# Patient Record
Sex: MALE | Race: BLACK OR AFRICAN AMERICAN | Employment: FULL TIME | URBAN - METROPOLITAN AREA
[De-identification: names, ages, dates, MRNs, and addresses within clinical notes are randomized per-mention and may not be internally consistent; named-entity substitution may affect disease eponyms.]

---

## 2017-09-02 PROBLEM — S76.111A RUPTURE OF RIGHT QUADRICEPS TENDON: Status: ACTIVE | Noted: 2017-09-02

## 2017-09-20 ENCOUNTER — OFFICE VISIT (OUTPATIENT)
Dept: ORTHOPEDIC SURGERY | Age: 57
End: 2017-09-20

## 2017-09-20 VITALS — WEIGHT: 315 LBS | RESPIRATION RATE: 16 BRPM | HEIGHT: 70 IN | BODY MASS INDEX: 45.1 KG/M2

## 2017-09-20 DIAGNOSIS — Z09 POSTOP CHECK: Primary | ICD-10-CM

## 2017-09-20 DIAGNOSIS — S76.111A RUPTURE OF RIGHT QUADRICEPS TENDON, INITIAL ENCOUNTER: ICD-10-CM

## 2017-09-20 PROCEDURE — 99024 POSTOP FOLLOW-UP VISIT: CPT | Performed by: ORTHOPAEDIC SURGERY

## 2017-09-20 RX ORDER — LORATADINE 10 MG/1
1 TABLET ORAL
COMMUNITY
End: 2017-11-30

## 2017-09-20 RX ORDER — TRAMADOL HYDROCHLORIDE 50 MG/1
50 TABLET ORAL
COMMUNITY
End: 2017-11-30

## 2017-09-20 RX ORDER — GABAPENTIN 600 MG/1
TABLET ORAL
COMMUNITY
Start: 2017-06-28 | End: 2017-09-20 | Stop reason: SDUPTHER

## 2017-09-20 RX ORDER — SIMVASTATIN 80 MG
80 TABLET ORAL
COMMUNITY
End: 2017-11-30

## 2017-09-20 RX ORDER — LEVETIRACETAM 500 MG/1
500 TABLET ORAL
COMMUNITY
Start: 2015-09-04

## 2017-09-20 RX ORDER — HYDROCODONE BITARTRATE AND ACETAMINOPHEN 5; 325 MG/1; MG/1
1 TABLET ORAL
COMMUNITY

## 2017-09-20 RX ORDER — PROMETHAZINE HYDROCHLORIDE 12.5 MG/1
12.5 TABLET ORAL
COMMUNITY
Start: 2010-06-30 | End: 2017-11-30

## 2017-09-20 RX ORDER — HYDROCHLOROTHIAZIDE 25 MG/1
25 TABLET ORAL
COMMUNITY

## 2017-09-20 RX ORDER — METHOCARBAMOL 500 MG/1
500 TABLET, FILM COATED ORAL
COMMUNITY
Start: 2010-06-26

## 2017-09-20 RX ORDER — LISINOPRIL 10 MG/1
10 TABLET ORAL
COMMUNITY

## 2017-09-20 RX ORDER — MECLIZINE HCL 12.5 MG/1
12.5 TABLET ORAL
COMMUNITY
Start: 2010-06-30 | End: 2017-11-30

## 2017-10-02 ENCOUNTER — TELEPHONE (OUTPATIENT)
Dept: ORTHOPEDIC SURGERY | Age: 57
End: 2017-10-02

## 2017-10-04 ENCOUNTER — TELEPHONE (OUTPATIENT)
Dept: ORTHOPEDIC SURGERY | Age: 57
End: 2017-10-04

## 2017-10-04 LAB — C-REACTIVE PROTEIN: 7.3

## 2017-10-18 ENCOUNTER — OFFICE VISIT (OUTPATIENT)
Dept: ORTHOPEDIC SURGERY | Age: 57
End: 2017-10-18

## 2017-10-18 VITALS — RESPIRATION RATE: 16 BRPM | WEIGHT: 315 LBS | BODY MASS INDEX: 45.1 KG/M2 | HEIGHT: 70 IN

## 2017-10-18 DIAGNOSIS — Z09 S/P ORTHOPEDIC SURGERY, FOLLOW-UP EXAM: Primary | ICD-10-CM

## 2017-10-18 DIAGNOSIS — R52 PAIN: ICD-10-CM

## 2017-10-18 DIAGNOSIS — S76.111A RUPTURE OF RIGHT QUADRICEPS TENDON, INITIAL ENCOUNTER: ICD-10-CM

## 2017-10-18 PROCEDURE — 99024 POSTOP FOLLOW-UP VISIT: CPT | Performed by: ORTHOPAEDIC SURGERY

## 2017-10-18 NOTE — PATIENT INSTRUCTIONS
Physical therapy. ..  lower extremity (quad strengthening program, begin preparing patient for full ambulation 11/28/17)  Right knee full range of motion as tolerated, without brace  Weight bearing as tolerated on left, only while in cam boot  Weight bearing as tolerated on right only while in knee brace  Use right knee brace only while standing, may remove brace when non weight bearing  Patient may use a wheel chair and travel by wheelchair. Please allow patient to go home for thanksgiving.    Return to the office in 6 weeks      Please show this form to nursing Center [place]

## 2017-10-20 ENCOUNTER — TELEPHONE (OUTPATIENT)
Dept: ORTHOPEDIC SURGERY | Age: 57
End: 2017-10-20

## 2017-10-20 NOTE — TELEPHONE ENCOUNTER
Nursing home called requesting a stop date for lovenox  Procedure Name: Repair of right quadriceps tendon tear (13443).    Surgery Date: September/5/2017

## 2017-10-26 ENCOUNTER — TELEPHONE (OUTPATIENT)
Dept: ORTHOPEDIC SURGERY | Age: 57
End: 2017-10-26

## 2017-10-27 ENCOUNTER — OFFICE VISIT (OUTPATIENT)
Dept: ORTHOPEDIC SURGERY | Age: 57
End: 2017-10-27

## 2017-10-27 DIAGNOSIS — Z09 S/P ORTHOPEDIC SURGERY, FOLLOW-UP EXAM: Primary | ICD-10-CM

## 2017-10-27 DIAGNOSIS — S76.111A RUPTURE OF RIGHT QUADRICEPS TENDON, INITIAL ENCOUNTER: ICD-10-CM

## 2017-10-27 PROCEDURE — 99024 POSTOP FOLLOW-UP VISIT: CPT | Performed by: ORTHOPAEDIC SURGERY

## 2017-10-27 NOTE — PROGRESS NOTES
Scribe Authentication Statement  Cristal Peterson scribed portions of this documentation for and in the presence of Dr. Aleyda Hernandez DO on 10/27/17 at 9:55 AM.    Provider Authentication Statement:  I, Venu Burgos DO, personally performed the services described in this documentation and they were scribed in my presence by the above listed scribe. The documentation is both accurate and complete. ORTHOPEDIC FOLLOW UP AFTER SURGERY    HISTORY OF PRESENT ILLNESS (HPI):   Wilma Harley is a 56y.o. year old male who is here for follow up of:  1. S/P orthopedic surgery, follow-up exam    2. Rupture of right quadriceps tendon, initial encounter        Procedure Name:  Repair of right quadriceps tendon tear (63360). Surgery Date: September/5/2017   Post-Op Number: 6 week(s)   How has the problem changed since the last visit: gradually improving. Patient recently was performing a straight leg raise and felt a tearing sensation in the medial aspect of the right knee. He states that he did not lose the ability to extend the right knee. However, he has been having increased pain at the quadricep tendon area and along the medial side of the knee. He denies any bruising. He denies any new changes in the contours of the knee. How have the associated manifestations changed since the last visit:   intermittent and . New associated manifestations are: none   Adverse effects or complications: none   Other Comments:  patient is doing well at [facility]. Knee pain continues to diminish other than his recent episode of knee pain with the straight leg raise. Current Outpatient Prescriptions   Medication Sig Dispense Refill    hydrochlorothiazide (HYDRODIURIL) 25 MG tablet Take 25 mg by mouth      HYDROcodone-acetaminophen (NORCO) 5-325 MG per tablet Take 1 tablet by mouth .       levETIRAcetam (KEPPRA) 500 MG tablet Take 500 mg by mouth      lisinopril (PRINIVIL;ZESTRIL) 10 MG tablet Take 10 mg by mouth     

## 2017-11-07 ENCOUNTER — TELEPHONE (OUTPATIENT)
Dept: ORTHOPEDIC SURGERY | Age: 57
End: 2017-11-07

## 2017-11-07 NOTE — TELEPHONE ENCOUNTER
WBAT in the brace.   Post op ROM brace fitted and given  Wear brace full time   Keep locked when ambulating  0° extension, 40° flexion when sitting  Continue physical therapy and quad strengthing program

## 2017-11-08 NOTE — TELEPHONE ENCOUNTER
Rainer Parr therapist called to get the WB status for patient informed  Therapist per Dr. Galan Button in the brace.   Post op ROM brace fitted and given  Wear brace full time   Keep locked when ambulating  0° extension, 40° flexion when sitting  Continue physical therapy and quad strengthing program

## 2017-11-29 ENCOUNTER — OFFICE VISIT (OUTPATIENT)
Dept: ORTHOPEDIC SURGERY | Age: 57
End: 2017-11-29

## 2017-11-29 VITALS — HEIGHT: 70 IN | RESPIRATION RATE: 16 BRPM | WEIGHT: 315 LBS | BODY MASS INDEX: 45.1 KG/M2

## 2017-11-29 DIAGNOSIS — Z09 S/P ORTHOPEDIC SURGERY, FOLLOW-UP EXAM: Primary | ICD-10-CM

## 2017-11-29 DIAGNOSIS — S76.111A RUPTURE OF RIGHT QUADRICEPS TENDON, INITIAL ENCOUNTER: ICD-10-CM

## 2017-11-29 PROCEDURE — 99024 POSTOP FOLLOW-UP VISIT: CPT | Performed by: ORTHOPAEDIC SURGERY

## 2017-11-29 NOTE — PATIENT INSTRUCTIONS
Discontinue knee brace  May ambulate with walker only, no independent ambulation (without Walker)  advance therapy and quad strengthening , active and passive ROM of right knee to tolerance  Goal of normal strength and ROM to right knee in 6 weeks   Ideally patient would be able to return home and full duty in 6 weeks, however individual results vary  And his progress will be re-evaluated in 6 weeks.    Return to the office in 6 weeks         Show this form to nursing home an therapy

## 2017-11-29 NOTE — PROGRESS NOTES
Scribe Authentication Statement  Hermelinda Garcia scribed portions of this documentation for and in the presence of Dr. Zia Dominique DO on 11/29/17 at 10:03 AM.    Provider Authentication Statement:  IRamon DO, personally performed the services described in this documentation and they were scribed in my presence by the above listed scribe. The documentation is both accurate and complete. ORTHOPEDIC FOLLOW UP AFTER SURGERY    HISTORY OF PRESENT ILLNESS (HPI):   Rufina Lawrence is a 56y.o. year old male who is here for follow up of:  1. S/P orthopedic surgery, follow-up exam    2. Rupture of right quadriceps tendon, initial encounter        Procedure Name: Repair of right quadriceps tendon tear (73746). Surgery Date: September/5/2017   Post-Op Number: 11 week(s)   How has the problem changed since the last visit: gradually improving   How have the associated manifestations changed since the last visit: stiffness and swelling  intermittent   New associated manifestations are: none   Adverse effects or complications: none   Other Comments:   patient is doing well at [facility]. Knee pain continues to diminish. He is still doing therapy and wearing his post op ROM brace. Current Outpatient Prescriptions   Medication Sig Dispense Refill    hydrochlorothiazide (HYDRODIURIL) 25 MG tablet Take 25 mg by mouth      HYDROcodone-acetaminophen (NORCO) 5-325 MG per tablet Take 1 tablet by mouth .       levETIRAcetam (KEPPRA) 500 MG tablet Take 500 mg by mouth      lisinopril (PRINIVIL;ZESTRIL) 10 MG tablet Take 10 mg by mouth      loratadine (CLARITIN) 10 MG tablet Take 1 tablet by mouth      meclizine (ANTIVERT) 12.5 MG tablet Take 12.5 mg by mouth      methocarbamol (ROBAXIN) 500 MG tablet Take 500 mg by mouth      promethazine (PHENERGAN) 12.5 MG tablet Take 12.5 mg by mouth      simvastatin (ZOCOR) 80 MG tablet Take 80 mg by mouth      traMADol (ULTRAM) 50 MG tablet Take 50 mg by mouth      divalproex (DEPAKOTE ER) 500 MG extended release tablet Take 1 tablet by mouth 2 times daily 30 tablet 3    gabapentin (NEURONTIN) 300 MG capsule Take 3 capsules by mouth 3 times daily 90 capsule 3    atorvastatin (LIPITOR) 10 MG tablet Take 1 tablet by mouth nightly 30 tablet 3    aspirin EC 81 MG EC tablet Take 1 tablet by mouth daily 30 tablet 3    baclofen (LIORESAL) 10 MG tablet Take 10 mg by mouth 3 times daily      fondaparinux (ARIXTRA) 2.5 MG/0.5ML SOLN Inject into the skin daily      oxyCODONE-acetaminophen (PERCOCET) 5-325 MG per tablet Take 1 tablet by mouth every 4 hours as needed for Pain .  carvedilol (COREG) 25 MG tablet Take 25 mg by mouth 2 times daily (with meals). ONE AND ONE HALF TAB TWICE DAILY       amlodipine (NORVASC) 10 MG tablet Take 10 mg by mouth 2 times daily. No current facility-administered medications for this visit. ORTHOPEDIC EXAM:     [] Left Upper Extremity [] Right Upper Extremity  [] Left Lower Extremity [x] Right Lower Extremity    Inspection:  Incision: [x]Healing well without significant drainage, dehiscence, or significant erythema. []Erythema greater than expected:  []Dehiscence:  []Drainage:   Circulation: [x] Warm, well perfused, good capillary refill  [] Cool  [] Sluggish cap refill  [] Other:   Skin: [x] Intact without discoloration  [] Pale  [] Erythematous  [] Ecchymotic   [] Maceration  [] Other:  [] Wound:   Cast/Splint: [x] None  [] Intact, dry, and functional without any unexpected wear  [] Poorly fitting  [] Wet  [] Foul smelling  [] Broken  [] Patient altered  [] Foreign body within  [] Soft  [] Other:     Palpation:  Tenderness: [] None  [] No more than expected  [x] Pertinent positives & negatives: tenderness medial joint line   Swelling: [] None  [x] No more than expected  [] Pertinent positives & negatives:   Calor (Heat) [x] None  [] No more than expected  [] Location:   Other Findings: - No palpable gap in extensor mechanism.   Full

## 2017-12-11 ENCOUNTER — TELEPHONE (OUTPATIENT)
Dept: ORTHOPEDIC SURGERY | Age: 57
End: 2017-12-11

## 2017-12-11 NOTE — TELEPHONE ENCOUNTER
Called patient back and repeated discussion from last office visit:    Discontinue knee brace  May ambulate with walker only, no independent ambulation (without Walker)  advance therapy and quad strengthening , active and passive ROM of right knee to tolerance  Goal of normal strength and ROM to right knee in 6 weeks   Ideally patient would be able to return home and full duty in 6 weeks, however individual results vary  And his progress will be re-evaluated in 6 weeks.    Return to the office in 6 weeks

## 2017-12-11 NOTE — TELEPHONE ENCOUNTER
Pt called requesting a call from provider. He would like to know his expected return to work date. He would also like to know if when he does return to work if he could have light duty work and working half days.

## 2018-01-05 ENCOUNTER — OFFICE VISIT (OUTPATIENT)
Dept: ORTHOPEDIC SURGERY | Age: 58
End: 2018-01-05

## 2018-01-05 VITALS — HEIGHT: 74 IN | WEIGHT: 315 LBS | BODY MASS INDEX: 40.43 KG/M2 | RESPIRATION RATE: 16 BRPM

## 2018-01-05 DIAGNOSIS — Z09 S/P ORTHOPEDIC SURGERY, FOLLOW-UP EXAM: Primary | ICD-10-CM

## 2018-01-05 DIAGNOSIS — S76.111A RUPTURE OF RIGHT QUADRICEPS TENDON, INITIAL ENCOUNTER: ICD-10-CM

## 2018-01-05 PROCEDURE — 99024 POSTOP FOLLOW-UP VISIT: CPT | Performed by: ORTHOPAEDIC SURGERY

## 2018-01-05 ASSESSMENT — ENCOUNTER SYMPTOMS
GASTROINTESTINAL NEGATIVE: 1
RESPIRATORY NEGATIVE: 1
EYES NEGATIVE: 1

## 2018-01-05 NOTE — PATIENT INSTRUCTIONS
:   · Diagnostic and treatment options discussed. Diagnosis explained. Natural history discussed. Patient's questions answered.   · No squatting, kneeling, climbing  · May do stairs one at a time  · Use cane for ambulation  · Above restrictions until January 22  · Return to the office January 19th with Dr. Corky Whiteside

## 2018-01-11 ENCOUNTER — TELEPHONE (OUTPATIENT)
Dept: ORTHOPEDIC SURGERY | Age: 58
End: 2018-01-11

## 2018-01-18 VITALS — BODY MASS INDEX: 40.43 KG/M2 | WEIGHT: 315 LBS | RESPIRATION RATE: 16 BRPM | HEIGHT: 74 IN

## 2018-01-18 ASSESSMENT — ENCOUNTER SYMPTOMS
GASTROINTESTINAL NEGATIVE: 1
EYES NEGATIVE: 1
RESPIRATORY NEGATIVE: 1

## 2018-01-18 NOTE — PROGRESS NOTES
COLONOSCOPY  2015    FOOT SURGERY Left 08/31/2017    achilles tendon repair    FOOT SURGERY Right 2005    achilles tendon repair    OTHER SURGICAL HISTORY  09/05/2017    rupture right quadriceps tendon    OTHER SURGICAL HISTORY  2015    \"had surgry for enlarged breast reduction       No family history on file. Social History     Social History    Marital status:      Spouse name: N/A    Number of children: N/A    Years of education: N/A     Social History Main Topics    Smoking status: Never Smoker    Smokeless tobacco: Never Used    Alcohol use No    Drug use: No    Sexual activity: Not Asked     Other Topics Concern    None     Social History Narrative    None         ORTHOPEDIC CONSTITUTION EXAM:     Vital Signs:  Resp 16   Ht 6' 2\" (1.88 m)   Wt (!) 315 lb (142.9 kg)   BMI 40.44 kg/m²     Constitution:  Generally, the patient is [x] alert, [x] appears stated age, and [x] in no distress. General body habitus is:   []Cachectic  []Thin  []Normal  []Overweight  []Obese  [x]Morbidly Obese     Psychiatric:   Judgement and insight is:  [x]Good    []Poor  Oriented to:  [x]person, [x]place, [x]time. Mood for circumstances is:  [x]Appropriate   []Inappropriate    Gait and Station:   Gait is:  []Normal  [x]Antalgic   []Trendelenburg   []Wide   []Unsteady   []Other:  Assistive Device:  [x]Cane    []Crutches    []Walker    []Wheelchair    []Gurney  Weight bearing on injured extremity:  [x]Is able   []Is not able      ORTHOPEDIC KNEE EXAM:     KNEE EXAM:  [x]Right []Left  Circulation:  [x] The limb is warm and well perfused. [x] Capillary refill is intact. [] Edema:    Inspection:  [x] Skin intact without abrasion or lacerations.   [x] Leg lengths equal  [] Unequal leg length:  [] Ecchymosis:  [] Abrasion:  [] Laceration:   [] Scar / Surgical incision:  [] Orthopedic appliance:  [] Joint effusion:  []Mild   []Moderate   []Severe    Alignment:  [x] Normal Knee Alignment   Normal Measured Findings

## 2018-01-19 ENCOUNTER — OFFICE VISIT (OUTPATIENT)
Dept: ORTHOPEDIC SURGERY | Age: 58
End: 2018-01-19

## 2018-01-19 DIAGNOSIS — S76.111A RUPTURE OF RIGHT QUADRICEPS TENDON, INITIAL ENCOUNTER: ICD-10-CM

## 2018-01-19 DIAGNOSIS — Z09 S/P ORTHOPEDIC SURGERY, FOLLOW-UP EXAM: Primary | ICD-10-CM

## 2018-01-19 PROCEDURE — 99213 OFFICE O/P EST LOW 20 MIN: CPT | Performed by: ORTHOPAEDIC SURGERY

## 2018-01-19 NOTE — LETTER
[facility]  [address]  [facility] [address]  Phone: [phone]  Fax: [phone]    William Castillo DO        January 19, 2018     Patient: Lg Newton   YOB: 1960   Date of Visit: 1/19/2018       To Whom It May Concern: It is my medical opinion that Alexia Boyd is able to return to work with restrictions:    · No squatting, kneeling, climbing  · May do stairs one at a time  · Use cane for ambulation    No prolonged standing more than 5min at a time. If you have any questions or concerns, please don't hesitate to call.     Sincerely,        William Castillo DO

## 2018-02-14 ENCOUNTER — HOSPITAL ENCOUNTER (OUTPATIENT)
Dept: PHYSICAL THERAPY | Age: 58
Discharge: HOME | End: 2018-02-28

## 2018-02-14 ASSESSMENT — PAIN DESCRIPTION - ONSET: ONSET: ON-GOING

## 2018-02-14 ASSESSMENT — PAIN DESCRIPTION - PROGRESSION: CLINICAL_PROGRESSION: GRADUALLY WORSENING

## 2018-02-14 ASSESSMENT — PAIN DESCRIPTION - ORIENTATION: ORIENTATION: LEFT;RIGHT

## 2018-02-14 ASSESSMENT — PAIN DESCRIPTION - DESCRIPTORS: DESCRIPTORS: ACHING

## 2018-02-14 ASSESSMENT — PAIN DESCRIPTION - PAIN TYPE: TYPE: CHRONIC PAIN;SURGICAL PAIN

## 2018-02-14 ASSESSMENT — PAIN DESCRIPTION - LOCATION: LOCATION: KNEE;ANKLE

## 2018-02-14 NOTE — FLOWSHEET NOTE
Outpatient Physical Therapy           [place]           [] Phone: [phone]   Fax: [phone]  [place]           [] Phone: [phone]   Fax: [phone]    Physical Therapy Daily Treatment Note  Date:  2018    Patient Name:  Jeanmarie Gilbert    :  1960  MRN: [id number]  Restrictions/Precautions: PT HAS DIFFICULTY LAY ON BACK FROM BACK PAIN  Diagnosis:  Right quad tear  Date of Surgery:   Treatment Diagnosis:  Decreased strength, gait deviation. Insurance/Certification information: [facility]   Referring Physician:   Tomasz Du [phone]  Next Doctor Visit:    Plan of care signed (Y/N):  n  Visit# / total visits:     Pain level: /10   Goals:       Long term goals  Time Frame for Long term goals : 8 Weeks  Long term goal 1: Pt will ambulate without SPC  Long term goal 2: Pt will ambulate with minimal gait deviation  Long term goal 3: Pt will be able to ambulate 12 stairs reciprocally  Long term goal 4: Pt will be able to pick object up from floor. Long term goal 5: Pt will have 5/5 strength         Subjective:  See eval      Any changes in Ambulatory Summary Sheet? Objective:  See eval        Exercises:  Exercise/Equipment Date Date Date Date                              Rec bike                  TABLE         SAQ                           STANDING           Step ups    X10, 4\"      TM retro walk   5', 0.6 mph      TM mush         TG squats    Lv 7     SL HS curl machine                                               Other Therapeutic Activities/Education:Patient received education on their current pathology and how their condition effects them with their functional activities. Patient understood discussion and questions were answered. Patient understands their activity limitations and understands rational for treatment progression.     Home Exercise Program:  QS, SLR,     Modality/intervention used:    [x] Therapeutic Exercise  [] Modalities:  [x] Therapeutic Activity     []

## 2018-02-16 ENCOUNTER — HOSPITAL ENCOUNTER (OUTPATIENT)
Dept: PHYSICAL THERAPY | Age: 58
Discharge: HOME | End: 2018-02-16

## 2018-02-19 ENCOUNTER — HOSPITAL ENCOUNTER (OUTPATIENT)
Dept: PHYSICAL THERAPY | Age: 58
Discharge: HOME | End: 2018-02-19

## 2018-03-01 ENCOUNTER — HOSPITAL ENCOUNTER (OUTPATIENT)
Dept: OTHER | Age: 58
Discharge: HOME | End: 2018-03-31

## 2018-03-05 ENCOUNTER — OFFICE VISIT (OUTPATIENT)
Dept: ORTHOPEDIC SURGERY | Age: 58
End: 2018-03-05

## 2018-03-05 VITALS — WEIGHT: 315 LBS | HEIGHT: 74 IN | RESPIRATION RATE: 16 BRPM | BODY MASS INDEX: 40.43 KG/M2

## 2018-03-05 DIAGNOSIS — Z09 S/P ORTHOPEDIC SURGERY, FOLLOW-UP EXAM: Primary | ICD-10-CM

## 2018-03-05 DIAGNOSIS — S76.111A RUPTURE OF RIGHT QUADRICEPS TENDON, INITIAL ENCOUNTER: ICD-10-CM

## 2018-03-05 PROCEDURE — 99212 OFFICE O/P EST SF 10 MIN: CPT | Performed by: ORTHOPAEDIC SURGERY

## 2018-03-05 NOTE — PROGRESS NOTES
Scribe Authentication Statement  Татьяна Delcid, scribed portions of this documentation for and in the presence of Dr. Maggie Jang DO on 3/5/18 at 10:11 AM.    Provider Authentication Statement:  I, Catalino Hillman DO, personally performed the services described in this documentation and they were scribed in my presence by the above listed scribe. The documentation is both accurate and complete. FOLLOW UP FOR GENERAL ORTHOPEDIC MANAGEMENT (3/5/18)  Procedure Name: Repair of right quadriceps tendon tear (42724). Surgery Date: September/5/2017     HPI: He states he is doing better. He completed his last physical therapy visit last week, he feels he overall is improving, but still has days where his leg feels weak. Review of Systems (ROS):   Problem = 1 , Extended = 2-9 , Complete = 10  Unless otherwise specified in this note, the R.O.S. is reviewed today with the patient and is as below-      Review of Systems   Constitutional: Negative. HENT: Negative. Eyes: Negative. Respiratory: Negative. Cardiovascular: Negative. Gastrointestinal: Negative. Genitourinary: Negative. Musculoskeletal: Negative. Skin: Negative. Neurological: Negative. Endo/Heme/Allergies: Negative. Psychiatric/Behavioral: Negative.         PAST HISTORY:   Unless otherwise specified in this note, the past history is reviewed today with the patient and is as follows-    Allergies   Allergen Reactions    Benadryl [Diphenhydramine Hcl] Shortness Of Breath    Morphine Shortness Of Breath       Current Outpatient Prescriptions   Medication Sig Dispense Refill    Pseudoephedrine-Guaifenesin ([place] [address] D PO) Take by mouth 2 times daily as needed      ipratropium-albuterol (DUONEB) 0.5-2.5 (3) MG/3ML SOLN nebulizer solution Inhale 1 vial into the lungs every 6 hours as needed for Shortness of Breath      hydrochlorothiazide (HYDRODIURIL) 25 MG tablet Take 25 mg by mouth      HYDROcodone-acetaminophen (NORCO) 5-325 MG per tablet Take 1 tablet by mouth .  levETIRAcetam (KEPPRA) 500 MG tablet Take 500 mg by mouth      lisinopril (PRINIVIL;ZESTRIL) 10 MG tablet Take 10 mg by mouth      methocarbamol (ROBAXIN) 500 MG tablet Take 500 mg by mouth      divalproex (DEPAKOTE ER) 500 MG extended release tablet Take 1 tablet by mouth 2 times daily 30 tablet 3    gabapentin (NEURONTIN) 300 MG capsule Take 3 capsules by mouth 3 times daily (Patient taking differently: Take 300 mg by mouth 3 times daily ) 90 capsule 3    atorvastatin (LIPITOR) 10 MG tablet Take 1 tablet by mouth nightly 30 tablet 3    aspirin EC 81 MG EC tablet Take 1 tablet by mouth daily 30 tablet 3    baclofen (LIORESAL) 10 MG tablet Take 10 mg by mouth 3 times daily      fondaparinux (ARIXTRA) 2.5 MG/0.5ML SOLN Inject into the skin daily      oxyCODONE-acetaminophen (PERCOCET) 5-325 MG per tablet Take 1 tablet by mouth every 6 hours as needed for Pain  .  carvedilol (COREG) 25 MG tablet Take 25 mg by mouth 2 times daily (with meals). ONE AND ONE HALF TAB TWICE DAILY       amlodipine (NORVASC) 10 MG tablet Take 10 mg by mouth 2 times daily. No current facility-administered medications for this visit.         Past Medical History:   Diagnosis Date    Cerebral artery occlusion with cerebral infarction ([facility] [address]     \"had TIA- weakness right side -better- this happened 2013\"    Chronic low back pain     \"have detoriated disc disease- L 5\"    Hx of blood clots     \"had blood clots in lungs in 1992 and in my legs in 2007   [address]     had blood patch done 9/1/2011\"\"had headaches from the spinal they did with surgery 9/2017- for another blood patch 121/2017    Hyperlipidemia     Hypertension     Prolonged emergence from general anesthesia     TIA (transient ischemic attack)        Past Surgical History:   Procedure Laterality Date    APPENDECTOMY  1989    COLONOSCOPY  2015    FOOT SURGERY Left 08/31/2017    achilles tendon repair   [facility] FOOT SURGERY Right 2005    achilles tendon repair    OTHER SURGICAL HISTORY  09/05/2017    rupture right quadriceps tendon    OTHER SURGICAL HISTORY  2015    \"had surgry for enlarged breast reduction       No family history on file. Social History     Social History    Marital status:      Spouse name: N/A    Number of children: N/A    Years of education: N/A     Social History Main Topics    Smoking status: Never Smoker    Smokeless tobacco: Never Used    Alcohol use No    Drug use: No    Sexual activity: Not Asked     Other Topics Concern    None     Social History Narrative    None         ORTHOPEDIC CONSTITUTION EXAM:     Vital Signs:  Resp 16   Ht 6' 2\" (1.88 m)   Wt (!) 315 lb (142.9 kg)   BMI 40.44 kg/m²     Constitution:  Generally, the patient is [x] alert, [x] appears stated age, and [x] in no distress. General body habitus is:   []Cachectic  []Thin  []Normal  []Overweight  []Obese  [x]Morbidly Obese     Psychiatric:   Judgement and insight is:  [x]Good    []Poor  Oriented to:  [x]person, [x]place, [x]time. Mood for circumstances is:  [x]Appropriate   []Inappropriate    Gait and Station:   Gait is:  [x]Normal  []Antalgic   []Trendelenburg   []Wide   []Unsteady   []Other:  Assistive Device:  [x]Cane    []Crutches    []Walker    []Wheelchair    []Gurney  Weight bearing on injured extremity:  [x]Is able   []Is not able      ORTHOPEDIC KNEE EXAM:     KNEE EXAM:  [x]Right []Left  Circulation:  [x] The limb is warm and well perfused. [x] Capillary refill is intact. [] Edema:    Inspection:  [x] Skin intact without abrasion or lacerations.   [x] Leg lengths equal  [] Unequal leg length:  [] Ecchymosis:  [] Abrasion:  [] Laceration:   [] Scar / Surgical incision:  [] Orthopedic appliance:  [] Joint effusion:  []Mild   []Moderate   []Severe    Alignment:  [x] Normal Knee Alignment   Normal Measured Findings Passively Correctable to Normal   Valgus Alignment: []  []   Varus Alignment: [] []     Range of Motion:  [] Normal Knee AROM     [] Normal Knee PROM     [] Deferred due to fracture  Active Knee Flexion: 100 []AROM = PROM   Active Knee Extension: Missing 3° of terminal extension []AROM = PROM   Passive Knee Flexion:  []AROM = PROM   Passive Knee Extension: Full []AROM = PROM     Motor Function:  [x] No gross motor weakness of hip [x] No gross motor weakness of knee  [x] No gross motor weakness of ankle    [x] No gross motor weakness of great toe  [] Motor weakness:     Neurologic:  [x] Sensation to light touch intact. [] Impaired:  [x] Deep tendon reflexes intact. [] Impaired:  [x] Coordination / proprioception intact. [] Impaired:     Palpation: (Not tested if not marked)     Normal Tenderness Swelling Crepitation Calor   Lateral Joint Line: [x] [] [] [] []   Lateral Collateral Ligament: [x] [] [] [] []   Fibula Head: [x] [] [] [] []   I.T.  Band: [x] [] [] [] []   Lateral Hamstrings: [x] [] [] [] []   Quad tendon: [x] [] [] [] []   Patella: [x] [] [] [] []   Prepatellar Bursa: [x] [] [] [] []   Patella Tendon: [x] [] [] [] []   Tibial Tubercle: [x] [] [] [] []   Medial Joint Line: [x] [] [] [] []   Pes Bursa: [x] [] [] [] []   Pes Tendons: [x] [] [] [] []   Medial Collateral Ligament: [x] [] [] [] []   Popliteal Fossa: [] [] [] [] []   Medial Head Gastrocnemius: [] [] [] [] []   Lateral Head Gastrocnemius: [] [] [] [] []   Other: [] [] [] [] []   Other: [] [] [] [] []     Comment:      Provocative Tests: (Not tested if not marked)   Negative Positive Positive Findings   Lachman Test: [x] []    Valgus Stress in Extension: [x] []    Valgus Stress in 30° Flexion: [x] []    Varus Stress Test: [x] []    Medial Carlos's Test: [] []    Lateral Carlos's Test: [] []    Thessaly Test: [] []    Pivot Shift: [] []    Felipe's Sign: [] []    Patella Inhibition: [] []    Patella Apprehension: [] []    Other: [] []    Other: [] []      Lateral patella glide at 20° [] <50% [] >50%    Medial patella glide at 20° [] >10mm   [] <10mm         MEDICAL DATA:     Imaging:  No x-rays taken in the office today. ASSESSMENT:     1. S/P orthopedic surgery, follow-up exam     2. Rupture of right quadriceps tendon, initial encounter           PLAN:   · Diagnostic and treatment options discussed. Diagnosis explained. Natural history discussed. Patient's questions answered. · Continue work restrictions as below:  · No squatting, kneeling, climbing  · May do stairs one at a time  · Use cane for ambulation  · No prolonged standing more than 5min at a time  · As patient progresses with physical therapy, restrictions may be weaned as patient tolerates  · Physical therapy reordered   · Follow up in 6 weeks. Consider d/c work restrictions at that time.

## 2018-03-05 NOTE — PATIENT INSTRUCTIONS
· Continue work restrictions as below:  · No squatting, kneeling, climbing  · May do stairs one at a time  · Use cane for ambulation  · No prolonged standing more than 5min at a time  · As patient progresses with physical therapy, restrictions may be weaned as patient tolerates  · Physical therapy reordered   · Follow up in 6 weeks

## 2018-03-09 ENCOUNTER — HOSPITAL ENCOUNTER (OUTPATIENT)
Dept: PHYSICAL THERAPY | Age: 58
Discharge: HOME | End: 2018-03-09

## 2018-03-09 NOTE — FLOWSHEET NOTE
Outpatient Physical Therapy           [place]           [] Phone: [phone]   Fax: [phone]  [place] Car[facility]           [] Phone: [phone]   Fax: [phone]    Physical Therapy Daily Treatment Note  Date:  3/9/2018    Patient Name:  Sam Marsh    :  1960  MRN: [id number]  Restrictions/Precautions: PT HAS DIFFICULTY LAY ON BACK FROM BACK PAIN  Diagnosis:  Right quad tear  Date of Surgery:   Treatment Diagnosis:  Decreased strength, gait deviation. Insurance/Certification information: [facility]   Referring Physician:   Kvng Zelaya [phone]  Next Doctor Visit:    Plan of care signed (Y/N):  n - resent  - resent 3/9  Visit# / total visits:     Pain level: 0/10 just stiff        Goals:       Long term goals  Time Frame for Long term goals : 8 Weeks  Long term goal 1: Pt will ambulate without [facility] Not met   Long term goal 2: Pt will ambulate with minimal gait deviation Not met   Long term goal 3: Pt will be able to ambulate 12 stairs reciprocally Progressing   Long term goal 4: Pt will be able to pick object up from floor. Long term goal 5: Pt will have 5/5 strength         Subjective: Patient states that the knee feels good, no pain, just stiff. Knee is doing okay mostly just buckling a lot. Hasn't gotten the brace yet. , states he needs to call the doc about this. Also in the last week the knee has been swelling a lot more, especially at the end of the working day. Any changes in Ambulatory Summary Sheet? No       Patient 13' late for appointment      Objective:  No significant edema or joint effusion present (per Christiane Pino, PT). Shaky for the last 3 repetitions of lunges. Difficulty with lunges with the opposite LE in front, has difficulty with the return due to quad weakness. Knee buckling after step ups and lunges due to muscle fatigue.         Exercises:  Exercise/Equipment 2/16/18 2/19/18  3/9/18                              Rec bike    5' 5' 5'            TABLE initiated [] Hold pending MD visit [] Discharge    Plan for Next Session:  Quad strengtheing and endruance, work on normalizing gait pattern. Add gastroc/soleus stretches next session.      Electronically signed by:  Letty Olmedo PTA     3/9/2018, 9:04 AM

## 2018-03-12 ENCOUNTER — HOSPITAL ENCOUNTER (OUTPATIENT)
Dept: PHYSICAL THERAPY | Age: 58
Discharge: HOME | End: 2018-03-12

## 2018-03-12 NOTE — FLOWSHEET NOTE
2*10 6\" 2*10   Ant step down    4\" 10*   Gastroc/Soleus stretch   FR 2*30\" ea   TM retro walk   5' 0.9mph BLE's  Retro walk at FT -- Retro walk at FT 3plt 5 laps    TM mush   5' RLE only  -- --   TG squats   L7 3*10 Shuttle LP B 4C 2*10 Shuttle LP B 4C 2*10   SL HS curl machine   30# 2*10 30# 2*10 30# 3*10   Mini ant lunge  15* 15* ea LE    SLB  1 UE assist 2*30\" --    Shuttle ecc lower   2C 10* 2C 10*  2C SL LP 10*   Wall sits  30-45°  10\"  10\"  5\" --   Wobble board    2*30\" ea dir a/p, s/s     Other Therapeutic Activities/Education:none    Home Exercise Program:  QS, SLR; added wall sits, SAQ, and step ups 3/9    Modality/intervention used:    [x] Therapeutic Exercise  [] Modalities:  [x] Therapeutic Activity     [] Ultrasound  [x] Elec  Stim  [x] Gait Training      [] Cervical Traction [] Lumbar Traction  [x] Neuromuscular Re-education    [x] Cold/hotpack [] Iontophoresis   [x] Instruction in HEP      [] Vasopneumatic     [x] Manual Therapy               [] Aquatic Therapy     Manual Treatments:  None    Modalities:  None    Communication with other providers:     Education provided to patient/caregiver:  none    Adverse reactions to treatment: None     Equipment provided: None     Assessment: Patient tolerated today's session well with no increase in pain. Patient continues with weakness in quads, particularly for control into terminal knee extension. Decreased quad strength particularly for eccentric activity. Will continue to benefit from skilled PT services to improve quad strength and knee stability. 0/10 pain after rx, just fatigued.     Time In / Time Out:   1505/1555    Timed Code/Total Treatment Minutes:  40'/50'  3 TE (last 10' was working hands on with a medicare patient)     Patients Report of Tolerance:    [] Patient limited by fatigue        [] Patient limited by pain   [] Patient limited by other medical complications   [] Other:     Prognosis:   [x] Good [] Fair  [] Poor    Plan:   [x] Continue per plan of care [] Alter current plan (see comments)  [] Plan of care initiated [] Hold pending MD visit [] Discharge    Plan for Next Session:  Quad strengtheing and endruance, work on normalizing gait pattern. Add gastroc/soleus stretches next session.      Electronically signed by:  Letty Olmedo PTA     3/12/2018, 10:32 AM

## 2018-03-16 ENCOUNTER — TELEPHONE (OUTPATIENT)
Dept: ORTHOPEDIC SURGERY | Age: 58
End: 2018-03-16

## 2018-03-16 NOTE — TELEPHONE ENCOUNTER
Patient called stated that his knee has swelled really big and he has elevated it and took NSAIDs and has a few questions to ask for the provider he would like a call.     Contact [phone]

## 2018-03-19 ENCOUNTER — HOSPITAL ENCOUNTER (OUTPATIENT)
Dept: PHYSICAL THERAPY | Age: 58
Discharge: HOME | End: 2018-03-19

## 2018-03-23 ENCOUNTER — HOSPITAL ENCOUNTER (OUTPATIENT)
Dept: PHYSICAL THERAPY | Age: 58
Discharge: HOME | End: 2018-03-23

## 2018-03-23 NOTE — FLOWSHEET NOTE
Outpatient Physical Therapy           [place]           [] Phone: [phone]   Fax: [phone]  [place]           [] Phone: [phone]   Fax: [phone]    Physical Therapy Daily Treatment Note  Date:  3/23/2018    Patient Name:  Akila Talamantes    :  1960  MRN: [id number]  Restrictions/Precautions: PT HAS DIFFICULTY LAY ON BACK FROM BACK PAIN  Diagnosis:  Right quad tear  Date of Surgery: 2017  Treatment Diagnosis:  Decreased strength, gait deviation. Insurance/Certification information: [facility]   Referring Physician:   Donna Costa [phone]  Next Doctor Visit:    Plan of care signed (Y/N):  n - resent  - resent 3/9  Visit# / total visits:     Pain level: 2/10        Goals:       Long term goals  Time Frame for Long term goals : 8 Weeks  Long term goal 1: Pt will ambulate without SPC NOT MET  Long term goal 2: Pt will ambulate with minimal gait deviation MET  Long term goal 3: Pt will be able to ambulate 12 stairs reciprocally NOT MET  Long term goal 4: Pt will be able to pick object up from floor. NOT MET  Long term goal 5: Pt will have 5/5 strength NOT MET         Subjective:   Pt states overal feel he is doing better. Knee is still swelling at times and weak. Feel Pt has been beneficial.         Any changes in Ambulatory Summary Sheet?  No         Objective:    KNEE  MMT  Flexion 5/5  Extension 4/5    End session pain 3/10    Exercises:  Exercise/Equipment 3/23/18                             Rec bike   5'             TABLE        SAQ                        STANDING          Anterior step ups         Ant step down       Gastroc/Soleus stretch      Retrowalk FT x10, 5 plts     TG squats        SL HS curl machine   10x2, 35#     Mini ant lunge       SLB       Shuttle squats  SL  Eccentric   10x2, 4C  x15, 4C          Wobble board       Retro stool scoots SL  50'     DL stool pulls 50'             Other Therapeutic Activities/Education:none    Home Exercise Program:  QS, SLR; added wall sits, SAQ, and step ups 3/9    Modality/intervention used:    [x] Therapeutic Exercise  [] Modalities:  [x] Therapeutic Activity     [] Ultrasound  [x] Elec  Stim  [x] Gait Training      [] Cervical Traction [] Lumbar Traction  [x] Neuromuscular Re-education    [x] Cold/hotpack [] Iontophoresis   [x] Instruction in HEP      [] Vasopneumatic     [x] Manual Therapy               [] Aquatic Therapy     Manual Treatments:  None    Modalities:  None    Communication with other providers:     Education provided to patient/caregiver:  none    Adverse reactions to treatment: None     Equipment provided: None     Assessment: SALONI has been seen in PT for 8 visits following right quad tear which occurred in 9/1/17. Pt has made strength gains with knee flexion, still limited with extension. Leg remains atrophied. Decreased ability to control decent on stairs. Will continue to benefit from PT addressing strength and functional deficits. Time In / Time Out:  1511/1620    Timed Code/Total Treatment Minutes:   59/69; 61 TE (4), 10 CP    Patients Report of Tolerance:    [] Patient limited by fatigue        [] Patient limited by pain   [] Patient limited by other medical complications   [] Other:     Prognosis:   [x] Good [] Fair  [] Poor    Plan:   [x] Continue per plan of care [] Alter current plan (see comments)  [] Plan of care initiated [] Hold pending MD visit [] Discharge    Plan for Next Session:  Quad strengthening and endurance, work on normalizing gait pattern.      Electronically signed by:  Chava Lu, PT     3/23/2018, 7:08 AM

## 2018-03-26 ENCOUNTER — HOSPITAL ENCOUNTER (OUTPATIENT)
Dept: PHYSICAL THERAPY | Age: 58
Discharge: HOME | End: 2018-03-26

## 2018-03-26 NOTE — FLOWSHEET NOTE
Activities/Education:none    Home Exercise Program:  QS, SLR; added wall sits, SAQ, and step ups 3/9    Modality/intervention used:    [x] Therapeutic Exercise  [] Modalities:  [x] Therapeutic Activity     [] Ultrasound  [x] Elec  Stim  [x] Gait Training      [] Cervical Traction [] Lumbar Traction  [x] Neuromuscular Re-education    [x] Cold/hotpack [] Iontophoresis   [x] Instruction in HEP      [] Vasopneumatic     [x] Manual Therapy               [] Aquatic Therapy     Manual Treatments:  None    Modalities:  CP x 10' no charge     Communication with other providers:     Education provided to patient/caregiver:  none    Adverse reactions to treatment: None     Equipment provided: None     Assessment: Pt tolerated treatment well. Pt stated that he was challenged,but that his knee did not swell up today. Pt rated pain at  3/10 after CP. Time In / Time Out:  1552/1640    Timed Code/Total Treatment Minutes: 38'/48'   3 TE ( 45') 1 CP x 10' no charge    Patients Report of Tolerance:    [] Patient limited by fatigue        [] Patient limited by pain   [] Patient limited by other medical complications   [] Other:     Prognosis:   [x] Good [] Fair  [] Poor    Plan:   [x] Continue per plan of care [] Alter current plan (see comments)  [] Plan of care initiated [] Hold pending MD visit [] Discharge    Plan for Next Session:  Quad strengthening and endurance, work on normalizing gait pattern.      Electronically signed by:  Meggan Watts PTA     3/26/2018, 11:24 AM     3/26/2018,6:56 PM

## 2018-04-01 ENCOUNTER — HOSPITAL ENCOUNTER (OUTPATIENT)
Dept: OTHER | Age: 58
Discharge: HOME | End: 2018-04-30

## 2018-04-09 ENCOUNTER — HOSPITAL ENCOUNTER (OUTPATIENT)
Dept: PHYSICAL THERAPY | Age: 58
Discharge: HOME | End: 2018-04-09

## 2018-04-13 ENCOUNTER — HOSPITAL ENCOUNTER (OUTPATIENT)
Dept: PHYSICAL THERAPY | Age: 58
Discharge: HOME | End: 2018-04-13

## 2018-04-16 ENCOUNTER — OFFICE VISIT (OUTPATIENT)
Dept: ORTHOPEDIC SURGERY | Age: 58
End: 2018-04-16

## 2018-04-16 VITALS — HEIGHT: 74 IN | WEIGHT: 304 LBS | BODY MASS INDEX: 39.01 KG/M2

## 2018-04-16 DIAGNOSIS — S76.111S RUPTURE OF RIGHT QUADRICEPS TENDON, SEQUELA: Primary | ICD-10-CM

## 2018-04-16 DIAGNOSIS — Z09 S/P ORTHOPEDIC SURGERY, FOLLOW-UP EXAM: ICD-10-CM

## 2018-04-16 PROCEDURE — 99213 OFFICE O/P EST LOW 20 MIN: CPT | Performed by: ORTHOPAEDIC SURGERY

## 2018-04-30 ENCOUNTER — HOSPITAL ENCOUNTER (OUTPATIENT)
Dept: PHYSICAL THERAPY | Age: 58
Discharge: HOME | End: 2018-04-30

## 2018-05-01 ENCOUNTER — HOSPITAL ENCOUNTER (OUTPATIENT)
Dept: OTHER | Age: 58
Discharge: HOME | End: 2018-05-31

## 2018-05-11 ENCOUNTER — HOSPITAL ENCOUNTER (OUTPATIENT)
Dept: PHYSICAL THERAPY | Age: 58
Discharge: HOME | End: 2018-05-11

## 2018-05-25 ENCOUNTER — HOSPITAL ENCOUNTER (OUTPATIENT)
Dept: PHYSICAL THERAPY | Age: 58
Discharge: HOME | End: 2018-05-25

## 2018-05-30 ENCOUNTER — OFFICE VISIT (OUTPATIENT)
Dept: ORTHOPEDIC SURGERY | Age: 58
End: 2018-05-30

## 2018-05-30 VITALS — RESPIRATION RATE: 16 BRPM | BODY MASS INDEX: 39.01 KG/M2 | WEIGHT: 304 LBS | HEIGHT: 74 IN

## 2018-05-30 DIAGNOSIS — S76.111A RUPTURE OF RIGHT QUADRICEPS TENDON, INITIAL ENCOUNTER: ICD-10-CM

## 2018-05-30 DIAGNOSIS — Z09 S/P ORTHOPEDIC SURGERY, FOLLOW-UP EXAM: Primary | ICD-10-CM

## 2018-05-30 PROCEDURE — 99212 OFFICE O/P EST SF 10 MIN: CPT | Performed by: ORTHOPAEDIC SURGERY

## 2018-06-01 ENCOUNTER — HOSPITAL ENCOUNTER (OUTPATIENT)
Dept: OTHER | Age: 58
Discharge: HOME | End: 2018-06-30

## 2018-06-01 NOTE — FLOWSHEET NOTE
Outpatient Physical Therapy           [place]           [] Phone: [phone]   Fax: [phone]  [place]           [] Phone: [phone]   Fax: [phone]    Physical Therapy Daily Treatment Note  Date:  2018    Patient Name:  Nishant Corcoran    :  1960  MRN: [id number]  Restrictions/Precautions: PT HAS DIFFICULTY LAY ON BACK FROM BACK PAIN  Diagnosis:  Right quad tear  Date of Surgery: 2017  Treatment Diagnosis:  Decreased strength, gait deviation. Insurance/Certification information: [place]   Referring Physician:   Damaso Gramajo [phone], Brooke Alarcon  Next Doctor Visit:    Plan of care signed (Y/N):  y  Visit# / total visits:     Pain level: 0/10        Goals:       Long term goals  Time Frame for Long term goals : 8 Weeks  Long term goal 1: Pt will ambulate without SPC MET   Long term goal 2: Pt will ambulate with minimal gait deviation MET  Long term goal 3: Pt will be able to ambulate 12 stairs reciprocally NOT MET  Long term goal 4: Pt will be able to pick object up from floor. NOT MET  Long term goal 5: Pt will have 5/5 strength NOT MET         Subjective:  Patient reports no pain in the knee today. This will be the last round of therapy. Doc wants him to work on stairs, walking backwards and picking things up off the floor. Any changes in Ambulatory Summary Sheet? No         Objective:   Unable to do 10\" step up without significant assist from UE's. Had to take several rest breaks during anterior step ups due to muscle fatigue. Significant UE use during 1/2 kneel to stand exercise along with lateral weight shift as patient attempted to decrease WB on RLE due to weakness.       Exercises:  18         Bike  5'         Functional Activities      1/2 kneel to stand (kneel to BOSU + airex)  10*    Lateral step downs  6\" 10*    Shuttle Ecc lowering  5C - *   Ant step up  6\" 30*    Squat to BOSU + airex pad med ball tap  8# 10*         Strengthening

## 2018-06-11 ENCOUNTER — TELEPHONE (OUTPATIENT)
Dept: ORTHOPEDIC SURGERY | Age: 58
End: 2018-06-11

## 2018-06-14 ENCOUNTER — HOSPITAL ENCOUNTER (OUTPATIENT)
Dept: PHYSICAL THERAPY | Age: 58
Discharge: HOME | End: 2018-06-14

## 2018-06-18 ENCOUNTER — HOSPITAL ENCOUNTER (OUTPATIENT)
Dept: PHYSICAL THERAPY | Age: 58
Discharge: HOME | End: 2018-06-18

## 2018-06-26 ENCOUNTER — HOSPITAL ENCOUNTER (OUTPATIENT)
Dept: PHYSICAL THERAPY | Age: 58
Discharge: HOME | End: 2018-06-26

## 2018-06-26 ENCOUNTER — TELEPHONE (OUTPATIENT)
Dept: ORTHOPEDIC SURGERY | Age: 58
End: 2018-06-26

## 2018-07-01 ENCOUNTER — HOSPITAL ENCOUNTER (OUTPATIENT)
Dept: OTHER | Age: 58
Discharge: HOME | End: 2018-07-31

## 2019-02-08 ENCOUNTER — HOSPITAL ENCOUNTER (OUTPATIENT)
Dept: NUCLEAR MEDICINE | Age: 59
Discharge: HOME | End: 2019-02-08
Payer: OTHER GOVERNMENT

## 2019-02-08 ENCOUNTER — HOSPITAL ENCOUNTER (OUTPATIENT)
Dept: CT IMAGING | Age: 59
Discharge: HOME | End: 2019-02-08
Payer: OTHER GOVERNMENT

## 2019-02-08 DIAGNOSIS — C61 MALIGNANT NEOPLASM OF PROSTATE (HCC): ICD-10-CM

## 2019-02-08 PROCEDURE — 78306 BONE IMAGING WHOLE BODY: CPT

## 2019-02-08 PROCEDURE — A9503 TC99M MEDRONATE: HCPCS | Performed by: UROLOGY

## 2019-02-08 PROCEDURE — 6360000004 HC RX CONTRAST MEDICATION: Performed by: UROLOGY

## 2019-02-08 PROCEDURE — 3430000000 HC RX DIAGNOSTIC RADIOPHARMACEUTICAL: Performed by: UROLOGY

## 2019-02-08 PROCEDURE — 74177 CT ABD & PELVIS W/CONTRAST: CPT

## 2019-02-08 RX ORDER — TC 99M MEDRONATE 20 MG/10ML
25 INJECTION, POWDER, LYOPHILIZED, FOR SOLUTION INTRAVENOUS
Status: COMPLETED | OUTPATIENT
Start: 2019-02-08 | End: 2019-02-08

## 2019-02-08 RX ADMIN — TC 99M MEDRONATE 25 MILLICURIE: 20 INJECTION, POWDER, LYOPHILIZED, FOR SOLUTION INTRAVENOUS at 11:40

## 2019-02-08 RX ADMIN — IOHEXOL 50 ML: 240 INJECTION, SOLUTION INTRATHECAL; INTRAVASCULAR; INTRAVENOUS; ORAL at 13:33

## 2019-02-08 RX ADMIN — IOPAMIDOL 80 ML: 755 INJECTION, SOLUTION INTRAVENOUS at 13:33

## 2019-04-18 ENCOUNTER — HOSPITAL ENCOUNTER (EMERGENCY)
Age: 59
Discharge: HOME | End: 2019-04-19
Payer: OTHER GOVERNMENT

## 2019-04-18 DIAGNOSIS — R31.9 HEMATURIA, UNSPECIFIED TYPE: ICD-10-CM

## 2019-04-18 DIAGNOSIS — T83.9XXA PROBLEM WITH FOLEY CATHETER, INITIAL ENCOUNTER (HCC): Primary | ICD-10-CM

## 2019-04-18 LAB
BACTERIA: NEGATIVE /HPF
BILIRUBIN URINE: NEGATIVE MG/DL
BLOOD, URINE: ABNORMAL
CLARITY: ABNORMAL
COLOR: ABNORMAL
GLUCOSE, URINE: NEGATIVE MG/DL
KETONES, URINE: NEGATIVE MG/DL
LEUKOCYTE ESTERASE, URINE: ABNORMAL
MUCUS: ABNORMAL HPF
NITRITE URINE, QUANTITATIVE: NEGATIVE
PH, URINE: 5 (ref 5–8)
PROTEIN UA: >500 MG/DL
RBC URINE: 3835 /HPF (ref 0–3)
SPECIFIC GRAVITY UA: 1.02 (ref 1–1.03)
TRICHOMONAS: ABNORMAL /HPF
UROBILINOGEN, URINE: NORMAL MG/DL (ref 0.2–1)
WBC UA: 34 /HPF (ref 0–2)

## 2019-04-18 PROCEDURE — 4500000027

## 2019-04-18 PROCEDURE — 99283 EMERGENCY DEPT VISIT LOW MDM: CPT

## 2019-04-18 PROCEDURE — 87086 URINE CULTURE/COLONY COUNT: CPT

## 2019-04-18 PROCEDURE — 6370000000 HC RX 637 (ALT 250 FOR IP): Performed by: PHYSICIAN ASSISTANT

## 2019-04-18 PROCEDURE — 81001 URINALYSIS AUTO W/SCOPE: CPT

## 2019-04-18 RX ORDER — OXYCODONE HYDROCHLORIDE AND ACETAMINOPHEN 5; 325 MG/1; MG/1
2 TABLET ORAL ONCE
Status: COMPLETED | OUTPATIENT
Start: 2019-04-18 | End: 2019-04-18

## 2019-04-18 RX ADMIN — OXYCODONE AND ACETAMINOPHEN 2 TABLET: 5; 325 TABLET ORAL at 23:28

## 2019-04-18 ASSESSMENT — PAIN SCALES - GENERAL
PAINLEVEL_OUTOF10: 9
PAINLEVEL_OUTOF10: 10

## 2019-04-18 ASSESSMENT — PAIN DESCRIPTION - LOCATION: LOCATION: GROIN

## 2019-04-18 ASSESSMENT — PAIN DESCRIPTION - PAIN TYPE: TYPE: ACUTE PAIN

## 2019-04-19 VITALS
TEMPERATURE: 98.1 F | HEART RATE: 63 BPM | RESPIRATION RATE: 14 BRPM | DIASTOLIC BLOOD PRESSURE: 84 MMHG | OXYGEN SATURATION: 98 % | SYSTOLIC BLOOD PRESSURE: 141 MMHG

## 2019-04-20 LAB
CULTURE: NORMAL
Lab: NORMAL
SPECIMEN: NORMAL

## 2020-09-01 ENCOUNTER — APPOINTMENT (OUTPATIENT)
Dept: GENERAL RADIOLOGY | Age: 60
End: 2020-09-01
Payer: OTHER GOVERNMENT

## 2020-09-01 ENCOUNTER — APPOINTMENT (OUTPATIENT)
Dept: CT IMAGING | Age: 60
End: 2020-09-01
Payer: OTHER GOVERNMENT

## 2020-09-01 ENCOUNTER — HOSPITAL ENCOUNTER (EMERGENCY)
Age: 60
Discharge: HOME | End: 2020-09-01
Payer: OTHER GOVERNMENT

## 2020-09-01 VITALS
RESPIRATION RATE: 18 BRPM | TEMPERATURE: 98.1 F | OXYGEN SATURATION: 98 % | BODY MASS INDEX: 40.43 KG/M2 | DIASTOLIC BLOOD PRESSURE: 87 MMHG | HEIGHT: 74 IN | SYSTOLIC BLOOD PRESSURE: 143 MMHG | WEIGHT: 315 LBS | HEART RATE: 87 BPM

## 2020-09-01 LAB
ALBUMIN SERPL-MCNC: 4.1 GM/DL (ref 3.4–5)
ALP BLD-CCNC: 80 IU/L (ref 40–129)
ALT SERPL-CCNC: 19 U/L (ref 10–40)
ANION GAP SERPL CALCULATED.3IONS-SCNC: 11 MMOL/L (ref 4–16)
AST SERPL-CCNC: 27 IU/L (ref 15–37)
BASOPHILS ABSOLUTE: 0.1 K/CU MM
BASOPHILS RELATIVE PERCENT: 0.7 % (ref 0–1)
BILIRUB SERPL-MCNC: 0.3 MG/DL (ref 0–1)
BUN BLDV-MCNC: 17 MG/DL (ref 6–23)
CALCIUM SERPL-MCNC: 9 MG/DL (ref 8.3–10.6)
CHLORIDE BLD-SCNC: 106 MMOL/L (ref 99–110)
CO2: 25 MMOL/L (ref 21–32)
CREAT SERPL-MCNC: 1.2 MG/DL (ref 0.9–1.3)
DIFFERENTIAL TYPE: ABNORMAL
EKG ATRIAL RATE: 69 BPM
EKG DIAGNOSIS: NORMAL
EKG P AXIS: 49 DEGREES
EKG P-R INTERVAL: 184 MS
EKG Q-T INTERVAL: 422 MS
EKG QRS DURATION: 98 MS
EKG QTC CALCULATION (BAZETT): 452 MS
EKG R AXIS: 29 DEGREES
EKG T AXIS: -57 DEGREES
EKG VENTRICULAR RATE: 69 BPM
EOSINOPHILS ABSOLUTE: 0.3 K/CU MM
EOSINOPHILS RELATIVE PERCENT: 3 % (ref 0–3)
GFR AFRICAN AMERICAN: >60 ML/MIN/1.73M2
GFR NON-AFRICAN AMERICAN: >60 ML/MIN/1.73M2
GLUCOSE BLD-MCNC: 110 MG/DL (ref 70–99)
HCT VFR BLD CALC: 41.5 % (ref 42–52)
HEMOGLOBIN: 13.3 GM/DL (ref 13.5–18)
IMMATURE NEUTROPHIL %: 0.3 % (ref 0–0.43)
LYMPHOCYTES ABSOLUTE: 2.2 K/CU MM
LYMPHOCYTES RELATIVE PERCENT: 23.7 % (ref 24–44)
MCH RBC QN AUTO: 26.9 PG (ref 27–31)
MCHC RBC AUTO-ENTMCNC: 32 % (ref 32–36)
MCV RBC AUTO: 83.8 FL (ref 78–100)
MONOCYTES ABSOLUTE: 0.7 K/CU MM
MONOCYTES RELATIVE PERCENT: 8 % (ref 0–4)
NUCLEATED RBC %: 0 %
PDW BLD-RTO: 15.5 % (ref 11.7–14.9)
PLATELET # BLD: 252 K/CU MM (ref 140–440)
PMV BLD AUTO: 11 FL (ref 7.5–11.1)
POTASSIUM SERPL-SCNC: 4.1 MMOL/L (ref 3.5–5.1)
PRO-BNP: 797.1 PG/ML
RBC # BLD: 4.95 M/CU MM (ref 4.6–6.2)
SEGMENTED NEUTROPHILS ABSOLUTE COUNT: 5.9 K/CU MM
SEGMENTED NEUTROPHILS RELATIVE PERCENT: 64.3 % (ref 36–66)
SODIUM BLD-SCNC: 142 MMOL/L (ref 135–145)
TOTAL IMMATURE NEUTOROPHIL: 0.03 K/CU MM
TOTAL NUCLEATED RBC: 0 K/CU MM
TOTAL PROTEIN: 7.1 GM/DL (ref 6.4–8.2)
TROPONIN T: <0.01 NG/ML
WBC # BLD: 9.1 K/CU MM (ref 4–10.5)

## 2020-09-01 PROCEDURE — 6370000000 HC RX 637 (ALT 250 FOR IP): Performed by: PHYSICIAN ASSISTANT

## 2020-09-01 PROCEDURE — 94664 DEMO&/EVAL PT USE INHALER: CPT

## 2020-09-01 PROCEDURE — 2700000000 HC OXYGEN THERAPY PER DAY

## 2020-09-01 PROCEDURE — 93010 ELECTROCARDIOGRAM REPORT: CPT | Performed by: INTERNAL MEDICINE

## 2020-09-01 PROCEDURE — 71275 CT ANGIOGRAPHY CHEST: CPT

## 2020-09-01 PROCEDURE — 80053 COMPREHEN METABOLIC PANEL: CPT

## 2020-09-01 PROCEDURE — 93005 ELECTROCARDIOGRAM TRACING: CPT | Performed by: PHYSICIAN ASSISTANT

## 2020-09-01 PROCEDURE — 6360000004 HC RX CONTRAST MEDICATION: Performed by: PHYSICIAN ASSISTANT

## 2020-09-01 PROCEDURE — 85025 COMPLETE CBC W/AUTO DIFF WBC: CPT

## 2020-09-01 PROCEDURE — 84484 ASSAY OF TROPONIN QUANT: CPT

## 2020-09-01 PROCEDURE — 94761 N-INVAS EAR/PLS OXIMETRY MLT: CPT

## 2020-09-01 PROCEDURE — 99285 EMERGENCY DEPT VISIT HI MDM: CPT

## 2020-09-01 PROCEDURE — 71045 X-RAY EXAM CHEST 1 VIEW: CPT

## 2020-09-01 PROCEDURE — 83880 ASSAY OF NATRIURETIC PEPTIDE: CPT

## 2020-09-01 PROCEDURE — 94640 AIRWAY INHALATION TREATMENT: CPT

## 2020-09-01 PROCEDURE — 6360000002 HC RX W HCPCS: Performed by: PHYSICIAN ASSISTANT

## 2020-09-01 RX ORDER — ALBUTEROL SULFATE 90 UG/1
2 AEROSOL, METERED RESPIRATORY (INHALATION) ONCE
Status: COMPLETED | OUTPATIENT
Start: 2020-09-01 | End: 2020-09-01

## 2020-09-01 RX ORDER — PREDNISONE 20 MG/1
60 TABLET ORAL DAILY
Qty: 15 TABLET | Refills: 0 | Status: SHIPPED | OUTPATIENT
Start: 2020-09-01 | End: 2020-09-06

## 2020-09-01 RX ORDER — SOFT LENS DISINFECTANT
SOLUTION, NON-ORAL MISCELLANEOUS
Qty: 1 DEVICE | Refills: 0 | Status: SHIPPED | OUTPATIENT
Start: 2020-09-01

## 2020-09-01 RX ORDER — MAGNESIUM SULFATE IN WATER 40 MG/ML
2 INJECTION, SOLUTION INTRAVENOUS ONCE
Status: DISCONTINUED | OUTPATIENT
Start: 2020-09-01 | End: 2020-09-01 | Stop reason: HOSPADM

## 2020-09-01 RX ORDER — ALBUTEROL SULFATE 2.5 MG/3ML
2.5 SOLUTION RESPIRATORY (INHALATION) EVERY 6 HOURS PRN
Qty: 30 EACH | Refills: 0 | Status: SHIPPED | OUTPATIENT
Start: 2020-09-01

## 2020-09-01 RX ORDER — PREDNISONE 20 MG/1
60 TABLET ORAL ONCE
Status: COMPLETED | OUTPATIENT
Start: 2020-09-01 | End: 2020-09-01

## 2020-09-01 RX ADMIN — Medication 2 PUFF: at 07:22

## 2020-09-01 RX ADMIN — ALBUTEROL SULFATE 2 PUFF: 90 AEROSOL, METERED RESPIRATORY (INHALATION) at 07:21

## 2020-09-01 RX ADMIN — IOPAMIDOL 80 ML: 755 INJECTION, SOLUTION INTRAVENOUS at 09:24

## 2020-09-01 RX ADMIN — PREDNISONE 60 MG: 20 TABLET ORAL at 09:01

## 2020-09-01 ASSESSMENT — PAIN SCALES - GENERAL: PAINLEVEL_OUTOF10: 2

## 2020-09-01 ASSESSMENT — PAIN DESCRIPTION - FREQUENCY: FREQUENCY: INTERMITTENT

## 2020-09-01 ASSESSMENT — PAIN DESCRIPTION - LOCATION: LOCATION: CHEST

## 2020-09-01 ASSESSMENT — PAIN DESCRIPTION - DESCRIPTORS: DESCRIPTORS: HEAVINESS

## 2020-09-01 NOTE — ED NOTES
Patient ambulated in the [facility] at this time with assist of Cynthia NAQVI. Oxygen level did not drop below 91% during ambulation, and did not drop until the end of walk upon return to the room.       Tal Goodwin RN  09/01/20 1857

## 2020-09-01 NOTE — ED TRIAGE NOTES
Pt came to the er this a.m. by ems from home for increased sob , and productive cough , greenish yellow , , has had the cough for a couple of wks , sob worse tonight , hx of copd and asthma meds not helping , pt not on home o2. Skin w/d pink a/ox4 no distress. Pt in gown placed on monitors .

## 2020-09-01 NOTE — ED PROVIDER NOTES
;EKG Interpretation    Interpreted by emergency department physician    Rhythm: normal sinus   Rate: normal  Axis: normal  Ectopy: none  Conduction: normal  ST Segments: normal  T Waves: non specific changes in inferior lateral leads which are unchanged from previous EKG obtained 2017  Q Waves: none    Clinical Impression: Sinus rhythm with T wave abnormalities    Corey Nicolas MD  09/01/20 9492

## 2020-09-01 NOTE — ED PROVIDER NOTES
EMERGENCY DEPARTMENT ENCOUNTER      PCP: Gregory Thomas MD    [facility]    Chief Complaint   Patient presents with    Shortness of Breath     increased tonight after cough    Cough     productive yellowish green for about 2 wks , hx asthma copd            This patient was not evaluated by the attending physician. I have independently evaluated this patient. HPI    Jose Elias Queen is a 64 y.o. male who presents with cough, wheezes, shortness of breath. Onset 2 weeks. Context is patient was seen approximately 1 week ago at which time he states COVID-19 test was negative and he was given a Z-Kingsley. He states Z-Kingsley marginally improved symptoms but upon discontinuation notes worsening symptoms. Cough is productive of yellowish sputum. No associated chest pain. No new lower extremity edema -has chronic left lower extremity edema. REVIEW OF SYSTEMS    Constitutional:  Denies fever, chills, weight loss or weakness   HENT:  Denies sore throat or ear pain   Cardiovascular:  Denies chest pain, palpitations   Respiratory: See HPI.   GI:  Denies abdominal pain, nausea, vomiting, or diarrhea  :  Denies any urinary symptoms   Musculoskeletal:  Denies back pain  Skin:  Denies rash  Neurologic:  Denies headache, focal weakness or sensory changes   Endocrine:  Denies polyuria or polydypsia   Lymphatic:  Denies swollen glands     All other review of systems are negative  See HPI and nursing notes for additional information     PAST MEDICAL AND SURGICAL HISTORY    Past Medical History:   Diagnosis Date    Cerebral artery occlusion with cerebral infarction ([facility] [address]     \"had TIA- weakness right side -better- this happened 2013\"    Chronic low back pain     \"have detoriated disc disease- L 5\"    Hx of blood clots     \"had blood clots in lungs in 1992 and in my legs in 2007   [facility]     had blood patch done 9/1/2011\"\"had headaches from the spinal they did with surgery 9/2017- for another blood patch 121/2017    Hyperlipidemia     Hypertension     Prolonged emergence from general anesthesia     TIA (transient ischemic attack)      Past Surgical History:   Procedure Laterality Date   [place]    COLONOSCOPY  2015    FOOT SURGERY Left 08/31/2017    achilles tendon repair    FOOT SURGERY Right 2005    achilles tendon repair    OTHER SURGICAL HISTORY  09/05/2017    rupture right quadriceps tendon    OTHER SURGICAL HISTORY  2015    \"had surgry for enlarged breast reduction       CURRENT MEDICATIONS    Current Outpatient Rx   Medication Sig Dispense Refill    predniSONE (DELTASONE) 20 MG tablet Take 3 tablets by mouth daily for 5 days 15 tablet 0    albuterol (PROVENTIL) (2.5 MG/3ML) 0.083% nebulizer solution Take 3 mLs by nebulization every 6 hours as needed for Wheezing 30 each 0    ipratropium (ATROVENT) 0.02 % nebulizer solution Take 2.5 mLs by nebulization every 6 hours as needed for Wheezing Please dispense one box. 2.5 mL 0    Respiratory Therapy Supplies (NEBULIZER) SHREE Use as directed with albuterol MDI and ipratropium bromide Nebules every 4 hours as needed for cough, wheezes. 1 Device 0    Pseudoephedrine-Guaifenesin (MUCINEX D PO) Take by mouth 2 times daily as needed      ipratropium-albuterol (DUONEB) 0.5-2.5 (3) MG/3ML SOLN nebulizer solution Inhale 1 vial into the lungs every 6 hours as needed for Shortness of Breath      hydrochlorothiazide (HYDRODIURIL) 25 MG tablet Take 25 mg by mouth      HYDROcodone-acetaminophen (NORCO) 5-325 MG per tablet Take 1 tablet by mouth .       levETIRAcetam (KEPPRA) 500 MG tablet Take 500 mg by mouth      lisinopril (PRINIVIL;ZESTRIL) 10 MG tablet Take 10 mg by mouth      methocarbamol (ROBAXIN) 500 MG tablet Take 500 mg by mouth      divalproex (DEPAKOTE ER) 500 MG extended release tablet Take 1 tablet by mouth 2 times daily 30 tablet 3    gabapentin (NEURONTIN) 300 MG capsule Take 3 capsules by mouth 3 times daily (Patient taking Fear of current or ex partner: Not on file     Emotionally abused: Not on file     Physically abused: Not on file     Forced sexual activity: Not on file   Other Topics Concern    Not on file   Social History Narrative    Not on file     No family history on file. PHYSICAL EXAM    VITAL SIGNS: BP (!) 117/91   Pulse 79   Temp 98.1 °F (36.7 °C) (Oral)   Resp 16   Ht 6' 2\" (1.88 m)   Wt (!) 320 lb (145.2 kg)   SpO2 97%   BMI 41.09 kg/m²    Constitutional:  Well developed, Well nourished. No distress  HENT:  Normocephalic, Atraumatic, PERRL. EOMI. Sclera clear. Conjunctiva normal, No discharge. Neck/Lymphatics: supple, no JVD, no swollen nodes  Cardiovascular:   RRR,  no murmurs/rubs/gallops. No JVD  Respiratory: Patient with mildly labored breathing. There is diminished airflow throughout with expiratory wheezes in all lung fields and prolonged expiratory phase. Abdomen: Bowel sounds normal, Soft, No tenderness, no masses. Musculoskeletal:    There is left lower extremity edema without redness or warmth or palpable cord-patient states this is baseline for at least several years  There is no right lower extremity edema or tenderness. No cyanosis. No cool or pale-appearing limb. Distal cap refill and pulses intact bilateral upper and lower extremities  Bilateral upper and lower extremity ROM intact without pain or obvious deficit  Integument:  Warm, Dry  Neurologic: Alert & oriented , No focal deficits noted. Cranial nerves II through XII grossly intact. Normal gross motor coordination & motor strength bilateral upper and lower extremities  Sensation intact.   Psychiatric:  Affect normal, Mood normal.       Labs:  Results for orders placed or performed during the hospital encounter of 09/01/20   CBC Auto Differential   Result Value Ref Range    WBC 9.1 4.0 - 10.5 K/CU MM    RBC 4.95 4.6 - 6.2 M/CU MM    Hemoglobin 13.3 (L) 13.5 - 18.0 GM/DL    Hematocrit 41.5 (L) 42 - 52 %    MCV 83.8 78 - 100 FL    MCH 26.9 (L) 27 - 31 PG    MCHC 32.0 32.0 - 36.0 %    RDW 15.5 (H) 11.7 - 14.9 %    Platelets 677 217 - 445 K/CU MM    MPV 11.0 7.5 - 11.1 FL    Differential Type AUTOMATED DIFFERENTIAL     Segs Relative 64.3 36 - 66 %    Lymphocytes % 23.7 (L) 24 - 44 %    Monocytes % 8.0 (H) 0 - 4 %    Eosinophils % 3.0 0 - 3 %    Basophils % 0.7 0 - 1 %    Segs Absolute 5.9 K/CU MM    Lymphocytes Absolute 2.2 K/CU MM    Monocytes Absolute 0.7 K/CU MM    Eosinophils Absolute 0.3 K/CU MM    Basophils Absolute 0.1 K/CU MM    Nucleated RBC % 0.0 %    Total Nucleated RBC 0.0 K/CU MM    Total Immature Neutrophil 0.03 K/CU MM    Immature Neutrophil % 0.3 0 - 0.43 %   Comprehensive Metabolic Panel   Result Value Ref Range    Sodium 142 135 - 145 MMOL/L    Potassium 4.1 3.5 - 5.1 MMOL/L    Chloride 106 99 - 110 mMol/L    CO2 25 21 - 32 MMOL/L    BUN 17 6 - 23 MG/DL    CREATININE 1.2 0.9 - 1.3 MG/DL    Glucose 110 (H) 70 - 99 MG/DL    Calcium 9.0 8.3 - 10.6 MG/DL    Alb 4.1 3.4 - 5.0 GM/DL    Total Protein 7.1 6.4 - 8.2 GM/DL    Total Bilirubin 0.3 0.0 - 1.0 MG/DL    ALT 19 10 - 40 U/L    AST 27 15 - 37 IU/L    Alkaline Phosphatase 80 40 - 129 IU/L    GFR Non-African American >60 >60 mL/min/1.73m2    GFR African American >60 >60 mL/min/1.73m2    Anion Gap 11 4 - 16   Troponin   Result Value Ref Range    Troponin T <0.010 <0.01 NG/ML   Brain Natriuretic Peptide   Result Value Ref Range    Pro-.1 (H) <300 PG/ML   EKG 12 Lead   Result Value Ref Range    Ventricular Rate 69 BPM    Atrial Rate 69 BPM    P-R Interval 184 ms    QRS Duration 98 ms    Q-T Interval 422 ms    QTc Calculation (Bazett) 452 ms    P Axis 49 degrees    R Axis 29 degrees    T Axis -57 degrees    Diagnosis       Normal sinus rhythm  T wave abnormality, consider inferior ischemia  Abnormal ECG  When compared with ECG of 09-SEP-2017 18:52,  Nonspecific T wave abnormality now evident in Anterior leads               RADIOLOGY    CTA PULMONARY W CONTRAST Preliminary Result   Negative CTA for pulmonary embolus. XR CHEST PORTABLE   Final Result   Patchy bilateral parahilar opacities may represent atypical/viral pneumonia   or pulmonary edema. ED COURSE & MEDICAL DECISION MAKING       Patient presents with wheezes, cough, shortness of breath consistent with previous COPD exacerbations. Per EMS report, pulse oximetry 88% on room air upon arrival to patient's residence. Patient placed on nasal cannula oxygen 4 L and arrives at ED with pulse oximetry at 95% on 4 L nasal cannula oxygen. Patient's clinical picture consistent with COPD as patient has wheezes and diminished airflow throughout on initial exam.  Patient was given albuterol MDI, oral steroid and telemetry monitoring reveals pulse oximetry remains at 95-96% on room air after discontinuation of nasal cannula oxygen. Serial rechecks reveal patient reports marketed subjective improvement and would like to go home. Of note, magnesium ordered, however not given as patient states he feels better prior to administration of magnesium. I offered admission to hospital for serial breathing treatments and close monitoring and patient is adamant that he would like to go home and agrees to immediately return to the emergency department if symptoms worsen or any new symptoms occur. At time of this decision which was shared decision at bedside with patient after discussion for approximately several minutes, patient's pulse oximetry at 95-96% on room air and patient's respiratory rate 16 breaths/min. I ambulated patient throughout ED hallway and pulse oximetry remains at this level as well as patient reports no subjective worsening or recurrence of wheezes, shortness of breath.     Given that patient recently completed oral antibiotic course and there is no evidence on imaging today of pneumonia, will hold on further antibiotics and plan for discharge home with oral steroids, ipratropium bromide and albuterol nebulized and with nebulizer. I recommend close outpatient follow-up with PCP. Patient agrees to return emergency department if symptoms worsen or any new symptoms develop. Clinical  IMPRESSION    1. COPD exacerbation (HCC)              Comment: Please note this report has been produced using speech recognition software and may contain errors related to that system including errors in grammar, punctuation, and spelling, as well as words and phrases that may be inappropriate. If there are any questions or concerns please feel free to contact the dictating provider for clarification.          Rosalind Chuma  09/01/20 1267

## 2020-09-02 ENCOUNTER — CARE COORDINATION (OUTPATIENT)
Dept: CARE COORDINATION | Age: 60
End: 2020-09-02

## 2020-09-02 NOTE — CARE COORDINATION
Attempted outreach call for ED f/u and COVID-19 monitoring; left a VM with ACM call-back information. If no call back, will make another attempt. Daniela Coley RN, BSN  Care Coordinator  Cell [phone]  Email [email or web address]

## 2020-09-03 NOTE — CARE COORDINATION
Patient contacted regarding Theresa Kingsley. Discussed COVID-19 related testing which was not done at this time. Test results were not done. Patient informed of results, if available? No    Care Transition Nurse/ Ambulatory Care Manager contacted the patient by telephone to perform post discharge assessment. Call within 2 business days of discharge: Yes. Verified name and  with patient as identifiers. Provided introduction to self, and explanation of the CTN/ACM role, and reason for call due to risk factors for infection and/or exposure to COVID-19. Symptoms reviewed with patient who verbalized the following symptoms: cough, shortness of breath, no new symptoms and no worsening symptoms. Due to no new or worsening symptoms encounter was not routed to provider for escalation. Discussed follow-up appointments. If no appointment was previously scheduled, appointment scheduling offered: [facility] follow up appointment(s): No future appointments. Non-[facility] follow up appointment(s): n/a    Non-face-to-face services provided:  Obtained and reviewed discharge summary and/or continuity of care documents  Education of patient/family/caregiver/guardian to support self-management-how to use nebulizer and rescue inhaler     Advance Care Planning:   Does patient have an Advance Directive:  not on file. Patient has following risk factors of: COPD. CTN/ACM reviewed discharge instructions, medical action plan and red flags such as increased shortness of breath, increasing fever and signs of decompensation with patient who verbalized understanding. Discussed exposure protocols and quarantine with CDC Guidelines What to do if you are sick with coronavirus disease .  Patient was given an opportunity for questions and concerns.  The patient agrees to contact the [facility] exposure line [phone], [facility] department [address]: ([phone]) and

## 2020-09-09 ENCOUNTER — CARE COORDINATION (OUTPATIENT)
Dept: CARE COORDINATION | Age: 60
End: 2020-09-09

## 2020-09-09 NOTE — CARE COORDINATION
Patient contacted regarding COVID-19 risk and screening. Discussed COVID-19 related testing which was not done at this time. Test results were not done. Patient informed of results, if available? Had testing at [facility] that was negative. Care Transition Nurse/ Ambulatory Care Manager contacted the patient by telephone to perform follow-up assessment. Verified name and  with patient as identifiers. Patient has following risk factors of: COPD and obesity. Symptoms reviewed with patient who verbalized the following symptoms: symptom free. Due to no new or worsening symptoms encounter was not routed to provider for escalation. Education provided regarding infection prevention, and signs and symptoms of COVID-19 and when to seek medical attention with patient who verbalized understanding. Discussed exposure protocols and quarantine from [address] you at higher risk for severe illness  and given an opportunity for questions and concerns. The patient agrees to contact the COVID-19 hotline [phone] or PCP office for questions related to their healthcare. CTN/ACM provided contact information for future reference. From CDC: Are you at higher risk for severe illness?  Wash your hands often.  Avoid close contact (6 feet, which is about two arm lengths) with people who are sick.  Put distance between yourself and other people if COVID-19 is spreading in your community.  Clean and disinfect frequently touched surfaces.  Avoid all cruise travel and non-essential air travel.  Call your healthcare professional if you have concerns about COVID-19 and your underlying condition or if you are sick. For more information on steps you can take to protect yourself, see CDC's How to [place] for follow-up call in 7-14 days based on severity of symptoms and risk factors.

## 2020-09-18 ENCOUNTER — CARE COORDINATION (OUTPATIENT)
Dept: CARE COORDINATION | Age: 60
End: 2020-09-18

## 2020-09-18 NOTE — CARE COORDINATION
Patient contacted regarding COVID-19 risk and screening. Discussed COVID-19 related testing which was not done at this time. Test results were not done. Patient informed of results, if available? No.    Care Transition Nurse/ Ambulatory Care Manager contacted the patient by telephone to perform follow-up assessment. Verified name and  with patient as identifiers. Patient has following risk factors of: COPD and obesity. Symptoms reviewed with patient who verbalized the following symptoms: cough and dry cough, denies SOB. .      Due to no new or worsening symptoms encounter was not routed to provider for escalation. Education provided regarding infection prevention, and signs and symptoms of COVID-19 and when to seek medical attention with patient who verbalized understanding. Discussed exposure protocols and quarantine from [address] you at higher risk for severe illness  and given an opportunity for questions and concerns. The patient agrees to contact the COVID-19 hotline [phone] or PCP office for questions related to their healthcare. CTN/ACM provided contact information for future reference. From CDC: Are you at higher risk for severe illness?  Wash your hands often.  Avoid close contact (6 feet, which is about two arm lengths) with people who are sick.  Put distance between yourself and other people if COVID-19 is spreading in your community.  Clean and disinfect frequently touched surfaces.  Avoid all cruise travel and non-essential air travel.  Call your healthcare professional if you have concerns about COVID-19 and your underlying condition or if you are sick. For more information on steps you can take to protect yourself, see CDC's How to [address] [place] [address] for follow-up call in 7-14 days based on severity of symptoms and risk factors. Pt's cough has returned, denies SOB, non productive and afebrile.  PCP with the [place] called in cough syrup with codeine but pt is calling PCP to obtain something without sedative affects. Pt would like f/u call next week therefore ACM is not resolving episode.

## 2020-09-23 ENCOUNTER — CARE COORDINATION (OUTPATIENT)
Dept: CARE COORDINATION | Age: 60
End: 2020-09-23

## 2020-09-23 NOTE — CARE COORDINATION
You Patient resolved from the Care Transitions episode on 9.23.20  Discussed COVID-19 related testing which was not done at this time. Test results were not done. Patient informed of results, if available? N/A    Patient/family has been provided the following resources and education related to COVID-19:                         Signs, symptoms and red flags related to COVID-19            CDC exposure and quarantine guidelines            [facility] exposure contact - [phone]            Contact for their local Department of Health                 Patient currently reports that the following symptoms have improved:  cough and no new/worsening symptoms     No further outreach scheduled with this CTN/ACM. Episode of Care resolved. Patient has this CTN/ACM contact information if future needs arise. Pt reports he is feeling much better. He stated he continues with a cough, mostly due to change in weather or temperature, and he occasionally has clear to light yellow phlegm. He reports no SOB at this time. Pt stated he will be f/u with his PCP to receive his annual flu and pneumonia vaccine. He also stated he still has the COVID-19 Hotline if he should need it. Pt did not have any other questions/concerns for ACM at this time.